# Patient Record
Sex: MALE | Race: WHITE | NOT HISPANIC OR LATINO | URBAN - METROPOLITAN AREA
[De-identification: names, ages, dates, MRNs, and addresses within clinical notes are randomized per-mention and may not be internally consistent; named-entity substitution may affect disease eponyms.]

---

## 2017-03-17 ENCOUNTER — EMERGENCY (EMERGENCY)
Facility: HOSPITAL | Age: 82
LOS: 1 days | End: 2017-03-17
Payer: MEDICARE

## 2017-03-17 PROCEDURE — 70498 CT ANGIOGRAPHY NECK: CPT | Mod: 26

## 2017-03-17 PROCEDURE — 71010: CPT | Mod: 26

## 2017-03-17 PROCEDURE — 70496 CT ANGIOGRAPHY HEAD: CPT | Mod: 26

## 2017-03-17 PROCEDURE — 99285 EMERGENCY DEPT VISIT HI MDM: CPT

## 2017-04-03 ENCOUNTER — APPOINTMENT (OUTPATIENT)
Dept: CARDIOLOGY | Facility: CLINIC | Age: 82
End: 2017-04-03

## 2017-04-18 ENCOUNTER — OUTPATIENT (OUTPATIENT)
Dept: OUTPATIENT SERVICES | Facility: HOSPITAL | Age: 82
LOS: 1 days | End: 2017-04-18

## 2017-04-26 PROBLEM — Z00.00 ENCOUNTER FOR PREVENTIVE HEALTH EXAMINATION: Noted: 2017-04-26

## 2017-05-03 ENCOUNTER — APPOINTMENT (OUTPATIENT)
Dept: CARDIOLOGY | Facility: CLINIC | Age: 82
End: 2017-05-03

## 2017-06-05 ENCOUNTER — APPOINTMENT (OUTPATIENT)
Dept: CARDIOLOGY | Facility: CLINIC | Age: 82
End: 2017-06-05

## 2017-10-09 ENCOUNTER — APPOINTMENT (OUTPATIENT)
Dept: CARDIOLOGY | Facility: CLINIC | Age: 82
End: 2017-10-09
Payer: MEDICARE

## 2017-10-09 PROCEDURE — 93000 ELECTROCARDIOGRAM COMPLETE: CPT

## 2017-10-09 PROCEDURE — 99214 OFFICE O/P EST MOD 30 MIN: CPT

## 2018-01-23 ENCOUNTER — RECORD ABSTRACTING (OUTPATIENT)
Age: 83
End: 2018-01-23

## 2018-01-24 ENCOUNTER — RECORD ABSTRACTING (OUTPATIENT)
Age: 83
End: 2018-01-24

## 2018-01-24 DIAGNOSIS — Z86.69 PERSONAL HISTORY OF OTHER DISEASES OF THE NERVOUS SYSTEM AND SENSE ORGANS: ICD-10-CM

## 2018-01-24 DIAGNOSIS — Z87.442 PERSONAL HISTORY OF URINARY CALCULI: ICD-10-CM

## 2018-01-24 DIAGNOSIS — Z78.9 OTHER SPECIFIED HEALTH STATUS: ICD-10-CM

## 2018-01-24 DIAGNOSIS — Z87.891 PERSONAL HISTORY OF NICOTINE DEPENDENCE: ICD-10-CM

## 2018-01-24 RX ORDER — APIXABAN 5 MG/1
5 TABLET, FILM COATED ORAL TWICE DAILY
Refills: 0 | Status: ACTIVE | COMMUNITY

## 2018-01-24 RX ORDER — METOPROLOL SUCCINATE 50 MG/1
50 TABLET, EXTENDED RELEASE ORAL DAILY
Refills: 0 | Status: ACTIVE | COMMUNITY

## 2018-01-24 RX ORDER — DILTIAZEM HYDROCHLORIDE 120 MG/1
120 TABLET ORAL DAILY
Refills: 0 | Status: ACTIVE | COMMUNITY

## 2018-01-24 RX ORDER — VIT A AND D3 IN COD LIVER OIL 1250-135
1000 CAPSULE ORAL DAILY
Refills: 0 | Status: ACTIVE | COMMUNITY

## 2018-01-24 RX ORDER — ATORVASTATIN CALCIUM 40 MG/1
40 TABLET, FILM COATED ORAL DAILY
Refills: 0 | Status: ACTIVE | COMMUNITY

## 2018-01-24 RX ORDER — ESCITALOPRAM OXALATE 10 MG/1
10 TABLET, FILM COATED ORAL DAILY
Refills: 0 | Status: ACTIVE | COMMUNITY

## 2018-01-24 RX ORDER — VIT A/VIT C/VIT E/ZINC/COPPER 7160-113
TABLET, DELAYED RELEASE (ENTERIC COATED) ORAL DAILY
Refills: 0 | Status: ACTIVE | COMMUNITY

## 2018-01-24 RX ORDER — LISINOPRIL 10 MG/1
10 TABLET ORAL DAILY
Refills: 0 | Status: ACTIVE | COMMUNITY

## 2018-01-29 ENCOUNTER — APPOINTMENT (OUTPATIENT)
Dept: CARDIOLOGY | Facility: CLINIC | Age: 83
End: 2018-01-29
Payer: MEDICARE

## 2018-01-29 VITALS
WEIGHT: 174 LBS | HEART RATE: 68 BPM | HEIGHT: 70.5 IN | DIASTOLIC BLOOD PRESSURE: 62 MMHG | BODY MASS INDEX: 24.63 KG/M2 | SYSTOLIC BLOOD PRESSURE: 108 MMHG

## 2018-01-29 PROCEDURE — 99214 OFFICE O/P EST MOD 30 MIN: CPT

## 2018-05-07 ENCOUNTER — RX RENEWAL (OUTPATIENT)
Age: 83
End: 2018-05-07

## 2018-07-12 ENCOUNTER — RECORD ABSTRACTING (OUTPATIENT)
Age: 83
End: 2018-07-12

## 2018-07-12 DIAGNOSIS — Z87.891 PERSONAL HISTORY OF NICOTINE DEPENDENCE: ICD-10-CM

## 2018-07-12 DIAGNOSIS — E78.5 HYPERLIPIDEMIA, UNSPECIFIED: ICD-10-CM

## 2018-07-12 DIAGNOSIS — Z86.79 PERSONAL HISTORY OF OTHER DISEASES OF THE CIRCULATORY SYSTEM: ICD-10-CM

## 2018-07-12 DIAGNOSIS — Z78.9 OTHER SPECIFIED HEALTH STATUS: ICD-10-CM

## 2018-07-12 DIAGNOSIS — Z87.442 PERSONAL HISTORY OF URINARY CALCULI: ICD-10-CM

## 2018-07-22 PROBLEM — Z78.9 ALCOHOL USE: Status: ACTIVE | Noted: 2018-01-23

## 2018-07-26 ENCOUNTER — APPOINTMENT (OUTPATIENT)
Dept: CARDIOLOGY | Facility: CLINIC | Age: 83
End: 2018-07-26
Payer: MEDICARE

## 2018-07-26 PROCEDURE — 93306 TTE W/DOPPLER COMPLETE: CPT

## 2018-07-30 ENCOUNTER — APPOINTMENT (OUTPATIENT)
Dept: CARDIOLOGY | Facility: CLINIC | Age: 83
End: 2018-07-30
Payer: MEDICARE

## 2018-07-30 VITALS
WEIGHT: 180 LBS | HEIGHT: 70 IN | BODY MASS INDEX: 25.77 KG/M2 | DIASTOLIC BLOOD PRESSURE: 78 MMHG | SYSTOLIC BLOOD PRESSURE: 130 MMHG | HEART RATE: 76 BPM

## 2018-07-30 PROCEDURE — 99214 OFFICE O/P EST MOD 30 MIN: CPT

## 2018-11-07 ENCOUNTER — RX RENEWAL (OUTPATIENT)
Age: 83
End: 2018-11-07

## 2019-02-08 ENCOUNTER — RECORD ABSTRACTING (OUTPATIENT)
Age: 84
End: 2019-02-08

## 2019-02-11 ENCOUNTER — APPOINTMENT (OUTPATIENT)
Dept: CARDIOLOGY | Facility: CLINIC | Age: 84
End: 2019-02-11
Payer: MEDICARE

## 2019-02-11 ENCOUNTER — NON-APPOINTMENT (OUTPATIENT)
Age: 84
End: 2019-02-11

## 2019-02-11 VITALS
HEIGHT: 70 IN | WEIGHT: 172 LBS | OXYGEN SATURATION: 96 % | BODY MASS INDEX: 24.62 KG/M2 | HEART RATE: 72 BPM | DIASTOLIC BLOOD PRESSURE: 70 MMHG | SYSTOLIC BLOOD PRESSURE: 130 MMHG

## 2019-02-11 PROCEDURE — 93000 ELECTROCARDIOGRAM COMPLETE: CPT

## 2019-02-11 PROCEDURE — 99214 OFFICE O/P EST MOD 30 MIN: CPT

## 2019-02-11 NOTE — DISCUSSION/SUMMARY
[FreeTextEntry1] : Assessment and suggestions.\par #1 chronic atrial fibrillation . well control rate. On anticoagulation without any significant problems. Stable CBC. Creatinine less than 1.5. Continue to follow CBC, BMP closely. Change in anticoagulation if creatinine gets worse. 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 no heart failure. Nonsmoker. Continue present medications. Low potassium diet. It appears his percussion is stable at present.   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 Seizure disorder, associated with apnea being followed with neurosurgery.\par #4 non-rheumatic valvular insufficiency with borderline elevation of pulmonary pressures. Continue to control blood pressure, heart rate. Clinically, no signs of congestive heart failure or renal insufficiency. Followup echocardiogram \par #5 mixed hyperlipidemia. Continue atorvastatin. Fasting lipid panel ordered. Low saturated fat, carbohydrate intake.\par #6. Carotid bruit. Carotid Dopplers in order.\par \par Counseling regarding low saturated fat, salt and carbohydrate intake was reviewed. Active lifestyle and regular. Exercise along with weight management is advised.\par \par \par All the above were at length reviewed. Answered all the questions. Thank you very much for this kind referral. Please do not hesitate to give me a call for any question.\par Part of this transcription was done with voice recognition software and phonetically similar errors are common. I apologize for that. Please donot hesitate to call for any questions due to above.\par \par f/u 6 months

## 2019-02-11 NOTE — ASSESSMENT
[FreeTextEntry1] : past tests for reference:\par echocardiogram 8/11/2015 LV ejection fraction 60% mild left and right atrial enlargement and concentric LVH mild mitral regurgitation and mild tricuspid regurgitation and mild aortic insufficiency mild to moderate pulmonary hypertension.\par \par Stress myocardial perfusion scan 10/5/2015.  4.6 minutes of Edmar protocol.  88% of predicted maximum heart rate.  Normal blood pressure response.  Nonischemic symptoms.  Ischemic EKG changes.  Normal perfusion scan presence of inferior attenuation.=\par \par  Echocardiogram. 817 2016. EF 60%. Mild biatrial enlargement. Mild mitral regurgitation. Moderate tricuspid regurgitation. Mild pulmonary hypertension. Unchanged from before.\par Carotid Doppler study. 817 2016. Mild carotid atherosclerosis.\par Abdominal aortic ultrasound. Mild atherosclerosis. No aneurysm noted.\par \par Reviewed on January 29, 2018.\par Labs from January 17, 2018. Reviewed.\par Stable CBC. Creatinine 1.41 potassium 4.9, sodium 138.\par \par Review July 30, 2018.\par Echocardiogram July 26, 2018 , ejection fraction 60% left atrial size 4.6. Mild to moderate mitral regurgitation.  mild aortic regurgitation. Pulmonary artery systolic pressure 38 mmHg borderline pulmonary hypertension.\par \par Reviewed on February 11, 2019.\par EKG February 11, 2019. Atrial fibrillation. Well controlled ventricular rate nonspecific ST-T changes.\par Labs February 4, 2019 stable CBC. Creatinine 1.42. Sodium 142. Potassium 5.1. LFT normal. GFR 45

## 2019-02-11 NOTE — PHYSICAL EXAM
[General Appearance - Well Developed] : well developed [Normal Appearance] : normal appearance [Well Groomed] : well groomed [General Appearance - Well Nourished] : well nourished [No Deformities] : no deformities [General Appearance - In No Acute Distress] : no acute distress [Normal Conjunctiva] : the conjunctiva exhibited no abnormalities [Eyelids - No Xanthelasma] : the eyelids demonstrated no xanthelasmas [Normal Jugular Venous A Waves Present] : normal jugular venous A waves present [Normal Jugular Venous V Waves Present] : normal jugular venous V waves present [No Jugular Venous Sanford A Waves] : no jugular venous sanford A waves [Respiration, Rhythm And Depth] : normal respiratory rhythm and effort [Exaggerated Use Of Accessory Muscles For Inspiration] : no accessory muscle use [Auscultation Breath Sounds / Voice Sounds] : lungs were clear to auscultation bilaterally [Heart Rate And Rhythm] : heart rate and rhythm were normal [Heart Sounds] : normal S1 and S2 [Arterial Pulses Normal] : the arterial pulses were normal [Edema] : no peripheral edema present [Veins - Varicosity Changes] : no varicosital changes were noted in the lower extremities [FreeTextEntry1] : casie 1-2/6 at the base, no gallop, no rub, carotid bruits [Abdomen Soft] : soft [Abnormal Walk] : normal gait [Nail Clubbing] : no clubbing of the fingernails [Cyanosis, Localized] : no localized cyanosis [Skin Color & Pigmentation] : normal skin color and pigmentation [] : no rash [Oriented To Time, Place, And Person] : oriented to person, place, and time [Affect] : the affect was normal [Mood] : the mood was normal [No Anxiety] : not feeling anxious

## 2019-02-11 NOTE — REASON FOR VISIT
[Follow-Up - Clinic] : a clinic follow-up of [Atrial Fibrillation] : atrial fibrillation [Hyperlipidemia] : hyperlipidemia [Hypertension] : hypertension [FreeTextEntry1] : 83-year-old comes in for followup consultation review labs \par He remains active.\par Since last seen offers no chest pain. No shortness of breath.\par No epilepsy/seizure activity.\par No PND, orthopnea, or pedal edema.\par No dizziness or syncopal episode.\par No claudication pain.\par No palpitations.\par Stable. Weight.\par Stable. Diet

## 2019-02-11 NOTE — HISTORY OF PRESENT ILLNESS
[FreeTextEntry1] :  active medical history now significant for \par  \par #1 atrial fibrillation. chronic,  Rate controlled . On anticoagulation.\par \par #2 essential hypertension. With preserved LV ejection fraction. Left ventricle hypertrophy. hypertensive heart disease with h/o Diastolic heart failure while in the hospital after management of renal insufficiency and renal stone disease. former smoker. no ckd. former smoker. \par \par #3 renal stone disease. Renal insufficiency. Status post urological procedure. 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. [place]. Underwent craniotomy.\par During the evaluation. He also had echocardiogram, which as shown LVEF 72%. mild aortic insufficiency. 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

## 2019-08-28 ENCOUNTER — APPOINTMENT (OUTPATIENT)
Dept: CARDIOLOGY | Facility: CLINIC | Age: 84
End: 2019-08-28
Payer: MEDICARE

## 2019-08-28 PROCEDURE — 93880 EXTRACRANIAL BILAT STUDY: CPT

## 2019-08-28 PROCEDURE — 93306 TTE W/DOPPLER COMPLETE: CPT

## 2019-09-16 ENCOUNTER — APPOINTMENT (OUTPATIENT)
Dept: CARDIOLOGY | Facility: CLINIC | Age: 84
End: 2019-09-16
Payer: MEDICARE

## 2019-09-16 VITALS
HEART RATE: 58 BPM | DIASTOLIC BLOOD PRESSURE: 68 MMHG | BODY MASS INDEX: 25.77 KG/M2 | SYSTOLIC BLOOD PRESSURE: 120 MMHG | HEIGHT: 70 IN | WEIGHT: 180 LBS | OXYGEN SATURATION: 94 %

## 2019-09-16 PROCEDURE — 99214 OFFICE O/P EST MOD 30 MIN: CPT

## 2019-09-16 NOTE — ASSESSMENT
[FreeTextEntry1] : past tests for reference:\par echocardiogram 8/11/2015 LV ejection fraction 60% mild left and right atrial enlargement and concentric LVH mild mitral regurgitation and mild tricuspid regurgitation and mild aortic insufficiency mild to moderate pulmonary hypertension.\par \par Stress myocardial perfusion scan 10/5/2015.  4.6 minutes of Edmar protocol.  88% of predicted maximum heart rate.  Normal blood pressure response.  Nonischemic symptoms.  Ischemic EKG changes.  Normal perfusion scan presence of inferior attenuation.=\par \par  Echocardiogram. 817 2016. EF 60%. Mild biatrial enlargement. Mild mitral regurgitation. Moderate tricuspid regurgitation. Mild pulmonary hypertension. Unchanged from before.\par Carotid Doppler study. 817 2016. Mild carotid atherosclerosis.\par Abdominal aortic ultrasound. Mild atherosclerosis. No aneurysm noted.\par \par Reviewed on January 29, 2018.\par Labs from January 17, 2018. Reviewed.\par Stable CBC. Creatinine 1.41 potassium 4.9, sodium 138.\par \par Review July 30, 2018.\par Echocardiogram July 26, 2018 , ejection fraction 60% left atrial size 4.6. Mild to moderate mitral regurgitation.  mild aortic regurgitation. Pulmonary artery systolic pressure 38 mmHg borderline pulmonary hypertension.\par \par Reviewed on February 11, 2019.\par EKG February 11, 2019. Atrial fibrillation. Well controlled ventricular rate nonspecific ST-T changes.\par Labs February 4, 2019 stable CBC. Creatinine 1.42. Sodium 142. Potassium 5.1. LFT normal. GFR 45\par \par Reviewed on September 16, 2019.\par Carotid Doppler study August 28, 2019. Mild nonobstructive disease.\par Echocardiogram August 28, 2019 ejection fraction 60% mild to moderate mitral regurgitation. Aortic root 2.9 cm moderately dilated. Left atrium. Mild pulmonary hypertension at 42 minutes of mercury.\par Lab August 7, 2019. Stable CBC, stable. CMP, with creatinine 1.49 percussion 4.6, sodium 140 LFTs normal, HDL 47, triglycerides 107, LDL 88, total cholesterol 156

## 2019-09-16 NOTE — REASON FOR VISIT
[Follow-Up - Clinic] : a clinic follow-up of [Atrial Fibrillation] : atrial fibrillation [Hyperlipidemia] : hyperlipidemia [Hypertension] : hypertension [FreeTextEntry1] : 84-year-old comes in for followup consultation review labs, echo, carotids \par He remains active.\par Since last seen offers no chest pain. No shortness of breath.\par No PND, orthopnea, or pedal edema.\par No dizziness or syncopal episode.\par No claudication pain.\par No palpitations.\par Stable. Weight.\par Stable. Diet

## 2019-09-16 NOTE — PHYSICAL EXAM
[Normal Appearance] : normal appearance [General Appearance - Well Developed] : well developed [Well Groomed] : well groomed [General Appearance - Well Nourished] : well nourished [No Deformities] : no deformities [General Appearance - In No Acute Distress] : no acute distress [Normal Conjunctiva] : the conjunctiva exhibited no abnormalities [Eyelids - No Xanthelasma] : the eyelids demonstrated no xanthelasmas [Normal Jugular Venous A Waves Present] : normal jugular venous A waves present [Normal Jugular Venous V Waves Present] : normal jugular venous V waves present [No Jugular Venous Sanford A Waves] : no jugular venous sanford A waves [Respiration, Rhythm And Depth] : normal respiratory rhythm and effort [Exaggerated Use Of Accessory Muscles For Inspiration] : no accessory muscle use [Auscultation Breath Sounds / Voice Sounds] : lungs were clear to auscultation bilaterally [Heart Sounds] : normal S1 and S2 [Heart Rate And Rhythm] : heart rate and rhythm were normal [Edema] : no peripheral edema present [Arterial Pulses Normal] : the arterial pulses were normal [FreeTextEntry1] : casie 1-2/6 at the base, no gallop, no rub, carotid bruits [Veins - Varicosity Changes] : no varicosital changes were noted in the lower extremities [Abdomen Soft] : soft [Abnormal Walk] : normal gait [Nail Clubbing] : no clubbing of the fingernails [Cyanosis, Localized] : no localized cyanosis [Skin Color & Pigmentation] : normal skin color and pigmentation [] : no rash [Affect] : the affect was normal [Oriented To Time, Place, And Person] : oriented to person, place, and time [Mood] : the mood was normal [No Anxiety] : not feeling anxious

## 2019-09-16 NOTE — HISTORY OF PRESENT ILLNESS
[FreeTextEntry1] :  active medical history now significant for \par  \par #1 atrial fibrillation. chronic,  Rate controlled . On anticoagulation.\par \par #2 essential hypertension. With preserved LV ejection fraction. Left ventricle hypertrophy. hypertensive heart disease with h/o Diastolic heart failure while in the hospital after management of renal insufficiency and renal stone disease. former smoker. former smoker. \par \par #3 renal stone disease. Renal insufficiency. Status post urological procedure. 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 [place] scan showed abnormality. Transferred to [place]. Underwent craniotomy.\par During the evaluation. He also had echocardiogram, which as shown LVEF 72%. mild aortic insufficiency. 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

## 2019-09-16 NOTE — DISCUSSION/SUMMARY
[FreeTextEntry1] : Assessment and suggestions.\par #1 chronic atrial fibrillation . well control rate. On anticoagulation without any significant problems. Stable CBC. Creatinine less than 1.5. Continue to follow CBC, BMP closely. Change in anticoagulation if creatinine gets worse. 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 no heart failure. Nonsmoker. Continue present medications. Low potassium diet. It appears his percussion is stable at present.   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 Seizure disorder, associated with apnea being followed with neurosurgery.\par #4 non-rheumatic valvular insufficiency with borderline elevation of pulmonary pressures. Continue to control blood pressure, heart rate. Clinically, no signs of congestive heart failure or renal insufficiency. Followup echocardiogram In a year\par #5 mixed hyperlipidemia. Continue atorvastatin. Fasting lipid panel ordered. Low saturated fat, carbohydrate intake.\par #6. Carotid bruit. Mild to moderate atherosclerosis. Less than 50% stenosis. Continue to follow. Continue lifestyle modifications.\par \par CMP CBC ordered in 6 months.\par \par Counseling regarding low saturated fat, salt and carbohydrate intake was reviewed. Active lifestyle and regular. Exercise along with weight management is advised.\par \par All the above were at length reviewed. Answered all the questions. Thank you very much for this kind referral. Please do not hesitate to give me a call for any question.\par Part of this transcription was done with voice recognition software and phonetically similar errors are common. I apologize for that. Please donot hesitate to call for any questions due to above.\par \par f/u 6 months

## 2020-07-10 ENCOUNTER — NON-APPOINTMENT (OUTPATIENT)
Age: 85
End: 2020-07-10

## 2020-07-10 ENCOUNTER — APPOINTMENT (OUTPATIENT)
Dept: CARDIOLOGY | Facility: CLINIC | Age: 85
End: 2020-07-10
Payer: MEDICARE

## 2020-07-10 VITALS
SYSTOLIC BLOOD PRESSURE: 160 MMHG | DIASTOLIC BLOOD PRESSURE: 88 MMHG | HEIGHT: 70 IN | OXYGEN SATURATION: 94 % | BODY MASS INDEX: 26.63 KG/M2 | TEMPERATURE: 98.5 F | WEIGHT: 186 LBS | HEART RATE: 88 BPM

## 2020-07-10 DIAGNOSIS — I27.20 PULMONARY HYPERTENSION, UNSPECIFIED: ICD-10-CM

## 2020-07-10 DIAGNOSIS — I38 ENDOCARDITIS, VALVE UNSPECIFIED: ICD-10-CM

## 2020-07-10 PROCEDURE — 93000 ELECTROCARDIOGRAM COMPLETE: CPT

## 2020-07-10 PROCEDURE — 99214 OFFICE O/P EST MOD 30 MIN: CPT

## 2020-07-10 NOTE — PHYSICAL EXAM
[General Appearance - Well Developed] : well developed [Normal Appearance] : normal appearance [Well Groomed] : well groomed [General Appearance - Well Nourished] : well nourished [No Deformities] : no deformities [General Appearance - In No Acute Distress] : no acute distress [Eyelids - No Xanthelasma] : the eyelids demonstrated no xanthelasmas [Normal Conjunctiva] : the conjunctiva exhibited no abnormalities [Normal Jugular Venous A Waves Present] : normal jugular venous A waves present [Normal Jugular Venous V Waves Present] : normal jugular venous V waves present [No Jugular Venous Sanford A Waves] : no jugular venous sanford A waves [Respiration, Rhythm And Depth] : normal respiratory rhythm and effort [Exaggerated Use Of Accessory Muscles For Inspiration] : no accessory muscle use [Auscultation Breath Sounds / Voice Sounds] : lungs were clear to auscultation bilaterally [Heart Rate And Rhythm] : heart rate and rhythm were normal [Arterial Pulses Normal] : the arterial pulses were normal [Heart Sounds] : normal S1 and S2 [Edema] : no peripheral edema present [Veins - Varicosity Changes] : no varicosital changes were noted in the lower extremities [Abdomen Soft] : soft [FreeTextEntry1] : Walks with help [Cyanosis, Localized] : no localized cyanosis [Nail Clubbing] : no clubbing of the fingernails [Skin Color & Pigmentation] : normal skin color and pigmentation [Oriented To Time, Place, And Person] : oriented to person, place, and time [] : no rash [Mood] : the mood was normal [Affect] : the affect was normal [No Anxiety] : not feeling anxious

## 2020-07-10 NOTE — REASON FOR VISIT
[Follow-Up - Clinic] : a clinic follow-up of [Atrial Fibrillation] : atrial fibrillation [Hypertension] : hypertension [Hyperlipidemia] : hyperlipidemia [FreeTextEntry1] : 85-year-old comes in for followup consultation medical management of his high risk medications and cardiac conditions.\par No bleeding complications\par Since last seen offers no chest pain. No shortness of breath.\par No PND, orthopnea, or pedal edema.\par No dizziness or syncopal episode.\par No claudication pain.\par No palpitations.\par Stable. Weight.\par Stable. Diet\par No recent hospital admission\par Activity level limited.  Balance abnormality.  But no recent falls.\par  [Spouse] : spouse

## 2020-07-10 NOTE — HISTORY OF PRESENT ILLNESS
[FreeTextEntry1] :  active medical history now significant for \par  \par #1 atrial fibrillation. chronic,  Rate controlled . On chronic anticoagulation.\par \par #2 essential hypertension. With preserved LV ejection fraction. Left ventricle hypertrophy. hypertensive heart disease with h/o Diastolic heart failure while in the hospital after management of renal insufficiency and renal stone disease. former smoker. former smoker. \par \par #3 renal stone disease. Renal insufficiency. Status post urological procedure. 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. Transfer[place]. Underwent craniotomy.\par During the evaluation. He also had echocardiogram, which as shown LVEF 72%. mild aortic insufficiency. 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

## 2020-07-10 NOTE — REVIEW OF SYSTEMS
[Abdominal Pain] : no abdominal pain [Heartburn] : no heartburn [see HPI] : see HPI [Negative] : Heme/Lymph

## 2020-09-25 NOTE — DISCUSSION/SUMMARY
[FreeTextEntry1] : Assessment and suggestions.\par 85-year-old gentleman with above medical history and active medical problems as noted below\par #1 chronic atrial fibrillation . well control rate. On anticoagulation without any significant problems.  Continue to follow CBC, BMP closely. 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 no heart failure. Nonsmoker. Continue present medications. Low potassium diet. It appears his percussion is stable at present.   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 Seizure disorder, associated with apnea being followed with neurosurgery.\par #4 non-rheumatic valvular insufficiency with borderline elevation of pulmonary pressures. Continue to control blood pressure, heart rate. Clinically, no signs of congestive heart failure or renal insufficiency. Followup echocardiogram ordered.\par #5 mixed hyperlipidemia. Continue atorvastatin. Fasting lipid panel ordered. Low saturated fat, carbohydrate intake.\par #6. Carotid bruit. Mild to moderate atherosclerosis. Less than 50% stenosis. Continue to follow. Continue lifestyle modifications.\par #7 labs and prescriptions done.\par \par Counseling regarding low saturated fat, salt and carbohydrate intake was reviewed. Active lifestyle and regular. Exercise along with weight management is advised.\par \par All the above were at length reviewed. Answered all the questions. Thank you very much for this kind referral. Please do not hesitate to give me a call for any question.\par Part of this transcription was done with voice recognition software and phonetically similar errors are common. I apologize for that. Please donot hesitate to call for any questions due to above.\par \par f/u 4-6 months
spouse

## 2020-11-11 ENCOUNTER — APPOINTMENT (OUTPATIENT)
Dept: CARDIOLOGY | Facility: CLINIC | Age: 85
End: 2020-11-11
Payer: MEDICARE

## 2020-11-11 PROCEDURE — 93306 TTE W/DOPPLER COMPLETE: CPT

## 2020-11-16 ENCOUNTER — APPOINTMENT (OUTPATIENT)
Dept: CARDIOLOGY | Facility: CLINIC | Age: 85
End: 2020-11-16
Payer: MEDICARE

## 2020-11-16 VITALS
BODY MASS INDEX: 26.92 KG/M2 | WEIGHT: 188 LBS | DIASTOLIC BLOOD PRESSURE: 70 MMHG | HEART RATE: 55 BPM | HEIGHT: 70 IN | SYSTOLIC BLOOD PRESSURE: 138 MMHG | OXYGEN SATURATION: 96 %

## 2020-11-16 DIAGNOSIS — Z79.01 LONG TERM (CURRENT) USE OF ANTICOAGULANTS: ICD-10-CM

## 2020-11-16 DIAGNOSIS — I13.10 HYPERTENSIVE HEART AND CHRONIC KIDNEY DISEASE W/OUT HEART FAILURE, WITH STAGE 1 THROUGH STAGE 4 CHRONIC KIDNEY DISEASE, OR UNSPECIFIED CHRONIC KIDNEY DISEASE: ICD-10-CM

## 2020-11-16 DIAGNOSIS — E78.5 HYPERLIPIDEMIA, UNSPECIFIED: ICD-10-CM

## 2020-11-16 DIAGNOSIS — I48.20 CHRONIC ATRIAL FIBRILLATION, UNSP: ICD-10-CM

## 2020-11-16 DIAGNOSIS — I10 ESSENTIAL (PRIMARY) HYPERTENSION: ICD-10-CM

## 2020-11-16 PROCEDURE — 99214 OFFICE O/P EST MOD 30 MIN: CPT

## 2020-11-16 RX ORDER — HYDROCHLOROTHIAZIDE 12.5 MG/1
12.5 CAPSULE ORAL
Qty: 30 | Refills: 6 | Status: DISCONTINUED | COMMUNITY
Start: 2018-05-07 | End: 2020-11-16

## 2020-11-16 RX ORDER — DIAPER,BRIEF,INFANT-TODD,DISP
50 EACH MISCELLANEOUS DAILY
Refills: 0 | Status: DISCONTINUED | COMMUNITY
End: 2020-11-16

## 2020-11-16 NOTE — PHYSICAL EXAM
[General Appearance - Well Developed] : well developed [Normal Appearance] : normal appearance [Well Groomed] : well groomed [General Appearance - Well Nourished] : well nourished [No Deformities] : no deformities [General Appearance - In No Acute Distress] : no acute distress [Normal Conjunctiva] : the conjunctiva exhibited no abnormalities [Eyelids - No Xanthelasma] : the eyelids demonstrated no xanthelasmas [Normal Jugular Venous A Waves Present] : normal jugular venous A waves present [Normal Jugular Venous V Waves Present] : normal jugular venous V waves present [No Jugular Venous Sanford A Waves] : no jugular venous sanford A waves [Respiration, Rhythm And Depth] : normal respiratory rhythm and effort [Exaggerated Use Of Accessory Muscles For Inspiration] : no accessory muscle use [Auscultation Breath Sounds / Voice Sounds] : lungs were clear to auscultation bilaterally [Heart Rate And Rhythm] : heart rate and rhythm were normal [Heart Sounds] : normal S1 and S2 [Arterial Pulses Normal] : the arterial pulses were normal [Edema] : no peripheral edema present [Veins - Varicosity Changes] : no varicosital changes were noted in the lower extremities [Abdomen Soft] : soft [Nail Clubbing] : no clubbing of the fingernails [Cyanosis, Localized] : no localized cyanosis [Skin Color & Pigmentation] : normal skin color and pigmentation [] : no rash [Oriented To Time, Place, And Person] : oriented to person, place, and time [Affect] : the affect was normal [Mood] : the mood was normal [No Anxiety] : not feeling anxious [FreeTextEntry1] : Walks with help

## 2020-11-16 NOTE — DISCUSSION/SUMMARY
[FreeTextEntry1] : ALMA DELIA ROSARIO is a 85 year old M who presents today Nov 16, 2020 with the above history and the following active issues:\par \par 85-year-old gentleman with above medical history and active medical problems as noted below\par #1 chronic atrial fibrillation . well control rate. On anticoagulation without any significant problems. Continue Toprol and Dilt. Continue to follow CBC, BMP closely. 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 no heart failure. Nonsmoker. Continue Lisinopril. Educated patient on low salt diet, alcohol intake in moderation, regular cardiovascular exercise, and weight reduction for improved BP control. Continue to monitor BP at home and call for persistently elevated readings (>140/90). 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 Seizure disorder, associated with apnea being followed with neurosurgery.\par \par #4 non-rheumatic valvular insufficiency with borderline elevation of pulmonary pressures. Continue to control blood pressure, heart rate. Clinically, no signs of congestive heart failure or renal insufficiency. Echo done 11/11/20 with stable findings.\par \par #5 mixed hyperlipidemia. Continue atorvastatin. Fasting lipid panel ordered. Low saturated fat, carbohydrate intake.\par \par #6. Carotid bruit. Mild to moderate atherosclerosis. Less than 50% stenosis. Continue to follow. Continue lifestyle modifications.\par \par #7 labs and prescriptions given.\par \par Ongoing f/u with PCP.\par \par F/U in 6 months.\par Discussed red flag symptoms, which would warrant sooner or emergent medical evaluation.\par Any questions and concerns were addressed and resolved.\par \par Sincerely,\par Cyndy Lyon [facility]\par Patient's history, testing, and plan was reviewed with supervising physician, Dr. Ayo Nam

## 2020-11-16 NOTE — REVIEW OF SYSTEMS
[see HPI] : see HPI [Negative] : Heme/Lymph [Abdominal Pain] : no abdominal pain [Heartburn] : no heartburn

## 2020-11-16 NOTE — REASON FOR VISIT
[Follow-Up - Clinic] : a clinic follow-up of [Atrial Fibrillation] : atrial fibrillation [Hyperlipidemia] : hyperlipidemia [Hypertension] : hypertension [Spouse] : spouse [FreeTextEntry1] : f/u labs

## 2020-11-16 NOTE — HISTORY OF PRESENT ILLNESS
[FreeTextEntry1] : ALMA DELIA ROSARIO is a 85 year old male with a past medical history of \par  \par #1 atrial fibrillation. chronic,  Rate controlled . On chronic anticoagulation.\par \par #2 essential hypertension. With preserved LV ejection fraction. Left ventricle hypertrophy. hypertensive heart disease with h/o Diastolic heart failure while in the hospital after management of renal insufficiency and renal stone disease. former smoker. former smoker. \par \par #3 renal stone disease. Renal insufficiency. Status post urological procedure. 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 CT scan showed abnormality. Transferred to [place]. Underwent craniotomy.\par During the evaluation. He also had echocardiogram, which as shown LVEF 72%. mild aortic insufficiency. 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 In the interim there have been no hospitalizations or procedures. He denies chest pain, pressure, palpitations, unusual shortness of breath, orthopnea, LE edema, lightheadedness, dizziness, near syncope or syncope. Presents today, 11/16/20, to review the results of recent echo. See details below. Former smoker; quit 20 years ago. Not on a formal exercise regimen. Hx of balance abnormality as previously noted.\par \par Testing:\par \par Echo 11/11/20: EF 60%, mild to mod MR, mild AR, severely dilated LA, normal wall motion, atrial fibrillation with diastolic dysfunction. mild pulm pressures. Compared with echo 8/28/19, no significant changes noted.\par

## 2021-05-07 ENCOUNTER — APPOINTMENT (OUTPATIENT)
Dept: CARDIOLOGY | Facility: CLINIC | Age: 86
End: 2021-05-07

## 2021-10-06 PROBLEM — I10 ESSENTIAL HYPERTENSION: Status: ACTIVE | Noted: 2018-01-24

## 2022-09-13 ENCOUNTER — INPATIENT (INPATIENT)
Facility: HOSPITAL | Age: 87
LOS: 1 days | Discharge: HOSPICE | End: 2022-09-15
Admitting: EMERGENCY MEDICINE

## 2022-09-13 ENCOUNTER — OUTPATIENT (OUTPATIENT)
Dept: OUTPATIENT SERVICES | Facility: HOSPITAL | Age: 87
LOS: 1 days | End: 2022-09-13

## 2022-09-13 PROCEDURE — 93010 ELECTROCARDIOGRAM REPORT: CPT

## 2022-09-13 PROCEDURE — 99285 EMERGENCY DEPT VISIT HI MDM: CPT

## 2022-09-13 PROCEDURE — 71045 X-RAY EXAM CHEST 1 VIEW: CPT | Mod: 26

## 2022-09-15 PROCEDURE — 76770 US EXAM ABDO BACK WALL COMP: CPT | Mod: 26

## 2022-09-26 DIAGNOSIS — E87.1 HYPO-OSMOLALITY AND HYPONATREMIA: ICD-10-CM

## 2022-09-26 DIAGNOSIS — R13.10 DYSPHAGIA, UNSPECIFIED: ICD-10-CM

## 2022-09-26 DIAGNOSIS — B96.20 UNSPECIFIED ESCHERICHIA COLI [E. COLI] AS THE CAUSE OF DISEASES CLASSIFIED ELSEWHERE: ICD-10-CM

## 2022-09-26 DIAGNOSIS — N18.9 CHRONIC KIDNEY DISEASE, UNSPECIFIED: ICD-10-CM

## 2022-09-26 DIAGNOSIS — Z51.5 ENCOUNTER FOR PALLIATIVE CARE: ICD-10-CM

## 2022-09-26 DIAGNOSIS — N39.0 URINARY TRACT INFECTION, SITE NOT SPECIFIED: ICD-10-CM

## 2022-09-26 DIAGNOSIS — E86.0 DEHYDRATION: ICD-10-CM

## 2022-09-26 DIAGNOSIS — I27.20 PULMONARY HYPERTENSION, UNSPECIFIED: ICD-10-CM

## 2022-09-26 DIAGNOSIS — I12.9 HYPERTENSIVE CHRONIC KIDNEY DISEASE WITH STAGE 1 THROUGH STAGE 4 CHRONIC KIDNEY DISEASE, OR UNSPECIFIED CHRONIC KIDNEY DISEASE: ICD-10-CM

## 2022-09-26 DIAGNOSIS — D75.839 THROMBOCYTOSIS, UNSPECIFIED: ICD-10-CM

## 2022-09-26 DIAGNOSIS — Z66 DO NOT RESUSCITATE: ICD-10-CM

## 2022-09-26 DIAGNOSIS — E78.5 HYPERLIPIDEMIA, UNSPECIFIED: ICD-10-CM

## 2022-09-26 DIAGNOSIS — D63.8 ANEMIA IN OTHER CHRONIC DISEASES CLASSIFIED ELSEWHERE: ICD-10-CM

## 2022-09-26 DIAGNOSIS — I48.20 CHRONIC ATRIAL FIBRILLATION, UNSPECIFIED: ICD-10-CM

## 2022-09-26 DIAGNOSIS — H35.30 UNSPECIFIED MACULAR DEGENERATION: ICD-10-CM

## 2022-09-26 DIAGNOSIS — G40.909 EPILEPSY, UNSPECIFIED, NOT INTRACTABLE, WITHOUT STATUS EPILEPTICUS: ICD-10-CM

## 2022-09-26 DIAGNOSIS — K92.2 GASTROINTESTINAL HEMORRHAGE, UNSPECIFIED: ICD-10-CM

## 2022-09-26 DIAGNOSIS — Z20.822 CONTACT WITH AND (SUSPECTED) EXPOSURE TO COVID-19: ICD-10-CM

## 2022-09-26 DIAGNOSIS — N17.9 ACUTE KIDNEY FAILURE, UNSPECIFIED: ICD-10-CM

## 2022-09-26 DIAGNOSIS — Z87.891 PERSONAL HISTORY OF NICOTINE DEPENDENCE: ICD-10-CM

## 2022-09-26 DIAGNOSIS — Q28.2 ARTERIOVENOUS MALFORMATION OF CEREBRAL VESSELS: ICD-10-CM

## 2022-09-26 DIAGNOSIS — E87.2 ACIDOSIS: ICD-10-CM

## 2023-02-07 DIAGNOSIS — K92.1 MELENA: ICD-10-CM

## 2023-02-07 DIAGNOSIS — D62 ACUTE POSTHEMORRHAGIC ANEMIA: ICD-10-CM

## 2023-02-07 DIAGNOSIS — N17.9 ACUTE KIDNEY FAILURE, UNSPECIFIED: ICD-10-CM
